# Patient Record
Sex: FEMALE | Race: WHITE | NOT HISPANIC OR LATINO | URBAN - METROPOLITAN AREA
[De-identification: names, ages, dates, MRNs, and addresses within clinical notes are randomized per-mention and may not be internally consistent; named-entity substitution may affect disease eponyms.]

---

## 2020-02-03 ENCOUNTER — EMERGENCY (EMERGENCY)
Facility: HOSPITAL | Age: 45
LOS: 0 days | Discharge: HOME | End: 2020-02-03
Attending: EMERGENCY MEDICINE
Payer: SELF-PAY

## 2020-02-03 VITALS
SYSTOLIC BLOOD PRESSURE: 150 MMHG | HEART RATE: 80 BPM | TEMPERATURE: 98 F | RESPIRATION RATE: 18 BRPM | OXYGEN SATURATION: 99 % | DIASTOLIC BLOOD PRESSURE: 94 MMHG

## 2020-02-03 VITALS — HEIGHT: 65 IN | WEIGHT: 154.98 LBS

## 2020-02-03 DIAGNOSIS — R10.9 UNSPECIFIED ABDOMINAL PAIN: ICD-10-CM

## 2020-02-03 DIAGNOSIS — F17.200 NICOTINE DEPENDENCE, UNSPECIFIED, UNCOMPLICATED: ICD-10-CM

## 2020-02-03 DIAGNOSIS — N93.9 ABNORMAL UTERINE AND VAGINAL BLEEDING, UNSPECIFIED: ICD-10-CM

## 2020-02-03 LAB
ALBUMIN SERPL ELPH-MCNC: 3.9 G/DL — SIGNIFICANT CHANGE UP (ref 3.3–5)
ALP SERPL-CCNC: 51 U/L — SIGNIFICANT CHANGE UP (ref 40–120)
ALT FLD-CCNC: 20 U/L — SIGNIFICANT CHANGE UP (ref 12–78)
ANION GAP SERPL CALC-SCNC: 5 MMOL/L — SIGNIFICANT CHANGE UP (ref 5–17)
APPEARANCE UR: ABNORMAL
APTT BLD: 31.8 SEC — SIGNIFICANT CHANGE UP (ref 27.5–36.3)
AST SERPL-CCNC: 12 U/L — LOW (ref 15–37)
BASOPHILS # BLD AUTO: 0.04 K/UL — SIGNIFICANT CHANGE UP (ref 0–0.2)
BASOPHILS NFR BLD AUTO: 0.5 % — SIGNIFICANT CHANGE UP (ref 0–2)
BILIRUB SERPL-MCNC: 0.2 MG/DL — SIGNIFICANT CHANGE UP (ref 0.2–1.2)
BILIRUB UR-MCNC: NEGATIVE — SIGNIFICANT CHANGE UP
BUN SERPL-MCNC: 9 MG/DL — SIGNIFICANT CHANGE UP (ref 7–23)
CALCIUM SERPL-MCNC: 8.8 MG/DL — SIGNIFICANT CHANGE UP (ref 8.5–10.1)
CHLORIDE SERPL-SCNC: 107 MMOL/L — SIGNIFICANT CHANGE UP (ref 96–108)
CO2 SERPL-SCNC: 25 MMOL/L — SIGNIFICANT CHANGE UP (ref 22–31)
COLOR SPEC: ABNORMAL
CREAT SERPL-MCNC: 0.77 MG/DL — SIGNIFICANT CHANGE UP (ref 0.5–1.3)
DIFF PNL FLD: ABNORMAL
EOSINOPHIL # BLD AUTO: 0.11 K/UL — SIGNIFICANT CHANGE UP (ref 0–0.5)
EOSINOPHIL NFR BLD AUTO: 1.4 % — SIGNIFICANT CHANGE UP (ref 0–6)
GLUCOSE SERPL-MCNC: 93 MG/DL — SIGNIFICANT CHANGE UP (ref 70–99)
GLUCOSE UR QL: NEGATIVE MG/DL — SIGNIFICANT CHANGE UP
HCT VFR BLD CALC: 39.7 % — SIGNIFICANT CHANGE UP (ref 34.5–45)
HGB BLD-MCNC: 13.8 G/DL — SIGNIFICANT CHANGE UP (ref 11.5–15.5)
IMM GRANULOCYTES NFR BLD AUTO: 0.4 % — SIGNIFICANT CHANGE UP (ref 0–1.5)
INR BLD: 1.12 RATIO — SIGNIFICANT CHANGE UP (ref 0.88–1.16)
KETONES UR-MCNC: ABNORMAL
LEUKOCYTE ESTERASE UR-ACNC: NEGATIVE — SIGNIFICANT CHANGE UP
LYMPHOCYTES # BLD AUTO: 1.65 K/UL — SIGNIFICANT CHANGE UP (ref 1–3.3)
LYMPHOCYTES # BLD AUTO: 21.1 % — SIGNIFICANT CHANGE UP (ref 13–44)
MCHC RBC-ENTMCNC: 30.7 PG — SIGNIFICANT CHANGE UP (ref 27–34)
MCHC RBC-ENTMCNC: 34.8 GM/DL — SIGNIFICANT CHANGE UP (ref 32–36)
MCV RBC AUTO: 88.4 FL — SIGNIFICANT CHANGE UP (ref 80–100)
MONOCYTES # BLD AUTO: 0.39 K/UL — SIGNIFICANT CHANGE UP (ref 0–0.9)
MONOCYTES NFR BLD AUTO: 5 % — SIGNIFICANT CHANGE UP (ref 2–14)
NEUTROPHILS # BLD AUTO: 5.59 K/UL — SIGNIFICANT CHANGE UP (ref 1.8–7.4)
NEUTROPHILS NFR BLD AUTO: 71.6 % — SIGNIFICANT CHANGE UP (ref 43–77)
NITRITE UR-MCNC: NEGATIVE — SIGNIFICANT CHANGE UP
PH UR: 8 — SIGNIFICANT CHANGE UP (ref 5–8)
PLATELET # BLD AUTO: 308 K/UL — SIGNIFICANT CHANGE UP (ref 150–400)
POTASSIUM SERPL-MCNC: 3.9 MMOL/L — SIGNIFICANT CHANGE UP (ref 3.5–5.3)
POTASSIUM SERPL-SCNC: 3.9 MMOL/L — SIGNIFICANT CHANGE UP (ref 3.5–5.3)
PROT SERPL-MCNC: 7.4 GM/DL — SIGNIFICANT CHANGE UP (ref 6–8.3)
PROT UR-MCNC: 500 MG/DL
PROTHROM AB SERPL-ACNC: 12.5 SEC — SIGNIFICANT CHANGE UP (ref 10–12.9)
RBC # BLD: 4.49 M/UL — SIGNIFICANT CHANGE UP (ref 3.8–5.2)
RBC # FLD: 13 % — SIGNIFICANT CHANGE UP (ref 10.3–14.5)
SODIUM SERPL-SCNC: 137 MMOL/L — SIGNIFICANT CHANGE UP (ref 135–145)
SP GR SPEC: 1.01 — SIGNIFICANT CHANGE UP (ref 1.01–1.02)
UROBILINOGEN FLD QL: NEGATIVE MG/DL — SIGNIFICANT CHANGE UP
WBC # BLD: 7.81 K/UL — SIGNIFICANT CHANGE UP (ref 3.8–10.5)
WBC # FLD AUTO: 7.81 K/UL — SIGNIFICANT CHANGE UP (ref 3.8–10.5)

## 2020-02-03 PROCEDURE — 81001 URINALYSIS AUTO W/SCOPE: CPT

## 2020-02-03 PROCEDURE — 99284 EMERGENCY DEPT VISIT MOD MDM: CPT | Mod: 25

## 2020-02-03 PROCEDURE — 85025 COMPLETE CBC W/AUTO DIFF WBC: CPT

## 2020-02-03 PROCEDURE — 36415 COLL VENOUS BLD VENIPUNCTURE: CPT

## 2020-02-03 PROCEDURE — 99284 EMERGENCY DEPT VISIT MOD MDM: CPT

## 2020-02-03 PROCEDURE — 80053 COMPREHEN METABOLIC PANEL: CPT

## 2020-02-03 PROCEDURE — 76856 US EXAM PELVIC COMPLETE: CPT | Mod: 26

## 2020-02-03 PROCEDURE — 85730 THROMBOPLASTIN TIME PARTIAL: CPT

## 2020-02-03 PROCEDURE — 81025 URINE PREGNANCY TEST: CPT

## 2020-02-03 PROCEDURE — 85610 PROTHROMBIN TIME: CPT

## 2020-02-03 PROCEDURE — 76856 US EXAM PELVIC COMPLETE: CPT

## 2020-02-03 RX ORDER — SODIUM CHLORIDE 9 MG/ML
1000 INJECTION INTRAMUSCULAR; INTRAVENOUS; SUBCUTANEOUS ONCE
Refills: 0 | Status: COMPLETED | OUTPATIENT
Start: 2020-02-03 | End: 2020-02-03

## 2020-02-03 RX ADMIN — SODIUM CHLORIDE 1000 MILLILITER(S): 9 INJECTION INTRAMUSCULAR; INTRAVENOUS; SUBCUTANEOUS at 10:31

## 2020-02-03 NOTE — ED STATDOCS - OBJECTIVE STATEMENT
43 y/o F with a PMHx of presents to the ED c/o abd cramping associated with vaginal bleeding beginning last week. States that her periods are usually normal lasting approx 5 days. States that she had her period 2 weeks ago which resolved, but period returned last week with heavy bleeding. Improved yesterday, but this morning woke up with a clot. Pt does not see an Ob/Gyn. +smoker. Pt reports passing clots. Denies pregnancy.

## 2020-02-03 NOTE — ED ADULT NURSE NOTE - OBJECTIVE STATEMENT
pt presents to ED c/o intermittent vaginal bleeding w/ clots extending past her regular period w/ abd cramping, nausea and HA. denies fevers at home, v/d, and dizziness. LMP 1/18/20. pt reports heavy bleeding this morning. pt AOx4, breathing symmetrical and unlabored, in no acute distress at this time.

## 2020-02-03 NOTE — ED STATDOCS - PATIENT PORTAL LINK FT
You can access the FollowMyHealth Patient Portal offered by [facility] by registering at the following website: [email or web address]. By joining [employer]’s FollowMyHealth portal, you will also be able to view your health information using other applications (apps) compatible with our system.

## 2020-02-03 NOTE — ED STATDOCS - PROVIDER TOKENS
PROVIDER:[TOKEN:[[id number]],FOLLOWUP:[1-3 Days]] PROVIDER:[TOKEN:[[id number]],FOLLOWUP:[1-3 Days]],PROVIDER:[TOKEN:[[id number]],FOLLOWUP:[1-3 Days]]

## 2020-02-03 NOTE — ED STATDOCS - CARE PROVIDER_API CALL
Manish Adams)  Obstetrics and Gynecology  [address], [address] [place] [address]  Phone: [phone]  Fax: [phone]  Follow Up Time: 1-3 Days Manish Adams)  Obstetrics and Gynecology  [address], [address]  [place] [address]  Phone: [phone]  Fax: [phone]  Follow Up Time: 1-3 Days    Helder Luther)  Obstetrics and Gynecology  [address] [place] [address]  Phone: [phone]  Fax: [phone]  Follow Up Time: 1-3 Days

## 2020-02-03 NOTE — ED STATDOCS - PROGRESS NOTE DETAILS
signed Donna Hudson PA-C Pt seen initially in intake by Dr. Genao   44F c/o irregular vaginal bleeding, pt had a "normal period" 2 weeks ago, bled for 5 days. Then started spotting again on 1/29 and it got heavier over the past few days. Pt alert, NAD. pt notes she doesn't see doctors and the last time she saw an OB was when she had her daughter 9 years ago. Pt alert, NAD, + suprapubic TTP. Plan labs, sono, UA. Pt denies urinary symptoms. No PMD. signed Donna Hudson PA-C   No significant findings on labwork or imaging. Pt notes today she has only used 1 pad and had 2 clots today. Recommend outpt f/u with gyn. Noted ovarian cysts and fibroid to pt. return precautions given. pt given copy of labwork and imaging results. Pt feeling well at DC, agrees with DC and plan of care.

## 2020-02-03 NOTE — ED STATDOCS - NSFOLLOWUPINSTRUCTIONS_ED_ALL_ED_FT
Abnormal Uterine Bleeding  Abnormal uterine bleeding is unusual bleeding from the uterus. It includes:  Bleeding or spotting between periods.Bleeding after sex.Bleeding that is heavier than normal.Periods that last longer than usual.Bleeding after menopause.Abnormal uterine bleeding can affect women at various stages in life, including teenagers, women in their reproductive years, pregnant women, and women who have reached menopause. Common causes of abnormal uterine bleeding include:  Pregnancy.Growths of tissue (polyps).A noncancerous tumor in the uterus (fibroid).Infection.Cancer.Hormonal imbalances.Any type of abnormal bleeding should be evaluated by a health care provider. Many cases are minor and simple to treat, while others are more serious. Treatment will depend on the cause of the bleeding.  Follow these instructions at home:  Monitor your condition for any changes.Do not use tampons, douche, or have sex if told by your health care provider.Change your pads often.Get regular exams that include pelvic exams and cervical cancer screening.Keep all follow-up visits as told by your health care provider. This is important.Contact a health care provider if:  Your bleeding lasts for more than one week.You feel dizzy at times.You feel nauseous or you vomit.Get help right away if:  You pass out.Your bleeding soaks through a pad every hour.You have abdominal pain.You have a fever.You become sweaty or weak.You pass large blood clots from your vagina.Summary  Abnormal uterine bleeding is unusual bleeding from the uterus.Any type of abnormal bleeding should be evaluated by a health care provider. Many cases are minor and simple to treat, while others are more serious.Treatment will depend on the cause of the bleeding.This information is not intended to replace advice given to you by your health care provider. Make sure you discuss any questions you have with your health care provider.     FOLLOW UP WITH YOUR GYN THIS WEEK. RETURN TO ER FOR ANY WORSENING SYMPTOMS OR NEW CONCERNS.

## 2020-02-03 NOTE — ED STATDOCS - CLINICAL SUMMARY MEDICAL DECISION MAKING FREE TEXT BOX
Labs, CBC. If pt not anemic and is not profusely bleeding, will schedule outpt obgyn. If bleeding is significant, will consult ob in HH